# Patient Record
Sex: MALE | Race: WHITE | NOT HISPANIC OR LATINO | Employment: PART TIME | URBAN - METROPOLITAN AREA
[De-identification: names, ages, dates, MRNs, and addresses within clinical notes are randomized per-mention and may not be internally consistent; named-entity substitution may affect disease eponyms.]

---
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
## 2024-01-11 ENCOUNTER — HOSPITAL ENCOUNTER (EMERGENCY)
Facility: CLINIC | Age: 19
Discharge: HOME | End: 2024-01-12
Attending: PHYSICIAN ASSISTANT | Admitting: PHYSICIAN ASSISTANT
Payer: COMMERCIAL

## 2024-01-11 ENCOUNTER — APPOINTMENT (OUTPATIENT)
Dept: GENERAL RADIOLOGY | Facility: CLINIC | Age: 19
End: 2024-01-11
Attending: PHYSICIAN ASSISTANT
Payer: COMMERCIAL

## 2024-01-11 VITALS
HEART RATE: 83 BPM | RESPIRATION RATE: 18 BRPM | OXYGEN SATURATION: 100 % | SYSTOLIC BLOOD PRESSURE: 152 MMHG | DIASTOLIC BLOOD PRESSURE: 92 MMHG | TEMPERATURE: 98 F

## 2024-01-11 DIAGNOSIS — S61.412A LACERATION OF LEFT HAND WITHOUT FOREIGN BODY: ICD-10-CM

## 2024-01-11 PROCEDURE — 73130 X-RAY EXAM OF HAND: CPT | Mod: LT

## 2024-01-11 PROCEDURE — 12042 INTMD RPR N-HF/GENIT2.6-7.5: CPT | Performed by: PHYSICIAN ASSISTANT

## 2024-01-11 PROCEDURE — 99283 EMERGENCY DEPT VISIT LOW MDM: CPT | Mod: 25 | Performed by: PHYSICIAN ASSISTANT

## 2024-01-11 RX ORDER — BUPIVACAINE HYDROCHLORIDE 5 MG/ML
10 INJECTION, SOLUTION EPIDURAL; INTRACAUDAL ONCE
Status: COMPLETED | OUTPATIENT
Start: 2024-01-11 | End: 2024-01-12

## 2024-01-11 ASSESSMENT — ACTIVITIES OF DAILY LIVING (ADL): ADLS_ACUITY_SCORE: 33

## 2024-01-12 PROCEDURE — 90715 TDAP VACCINE 7 YRS/> IM: CPT | Performed by: PHYSICIAN ASSISTANT

## 2024-01-12 PROCEDURE — 250N000011 HC RX IP 250 OP 636: Performed by: PHYSICIAN ASSISTANT

## 2024-01-12 PROCEDURE — 90471 IMMUNIZATION ADMIN: CPT | Performed by: PHYSICIAN ASSISTANT

## 2024-01-12 RX ADMIN — CLOSTRIDIUM TETANI TOXOID ANTIGEN (FORMALDEHYDE INACTIVATED), CORYNEBACTERIUM DIPHTHERIAE TOXOID ANTIGEN (FORMALDEHYDE INACTIVATED), BORDETELLA PERTUSSIS TOXOID ANTIGEN (GLUTARALDEHYDE INACTIVATED), BORDETELLA PERTUSSIS FILAMENTOUS HEMAGGLUTININ ANTIGEN (FORMALDEHYDE INACTIVATED), BORDETELLA PERTUSSIS PERTACTIN ANTIGEN, AND BORDETELLA PERTUSSIS FIMBRIAE 2/3 ANTIGEN 0.5 ML: 5; 2; 2.5; 5; 3; 5 INJECTION, SUSPENSION INTRAMUSCULAR at 00:01

## 2024-01-12 RX ADMIN — BUPIVACAINE HYDROCHLORIDE 50 MG: 5 INJECTION, SOLUTION EPIDURAL; INTRACAUDAL at 00:00

## 2024-01-12 NOTE — DISCHARGE INSTRUCTIONS
It was a pleasure working with you today!  I hope your condition improves rapidly!     Change your dressing once daily.  Use bacitracin ointment underneath the dressing for the first 4 days, and then just use a regular bandage.  Keep your wound dry for the next 1 week if at all possible.  Sutures can be considered for removal in 12 days.

## 2024-01-12 NOTE — ED PROVIDER NOTES
History     Chief Complaint   Patient presents with    Laceration     HPI  Reagan Smalls is a 18 year old male who presents with his mother for evaluation of a laceration to the left palm that occurred roughly 2.5 hours prior to this evaluation.  States that he was using an electric grinding wheel.  The wheel broke apart and struck his palm.  He immediately had bleeding, which she controlled with pressure.  He does not think there is any foreign body in the hand.  Denies any alteration to his range of motion or strength.  Sensation normal per his report.  Unsure when his last tetanus was.  Based on chart review, last tetanus was early 2016.    Allergies:  Allergies   Allergen Reactions    Amoxicillin Rash    Pcn [Penicillins] Rash       Problem List:    Patient Active Problem List    Diagnosis Date Noted    Osteochondral defect of ankle 05/29/2019     Priority: Medium    Intermittent asthma 01/20/2012     Priority: Medium     Triggers: exercise, cats, spring and fall allergies            Severe obesity (H) 02/04/2010     Priority: Medium    Nevus 02/04/2010     Priority: Medium     6 x 3 mm, left hip             Past Medical History:    Past Medical History:   Diagnosis Date    Wheezing        Past Surgical History:    Past Surgical History:   Procedure Laterality Date    CIRCUMCISION,OTHER,<28 D/O      PE TUBES  2006    Persistent OME    TONSILLECTOMY, ADENOIDECTOMY, COMBINED  7/26/2011    Procedure:COMBINED TONSILLECTOMY, ADENOIDECTOMY; Surgeon:VAHID ALMENDAREZ; Location:[facility] OR       Family History:    Family History   Problem Relation Age of Onset    Asthma No family hx of     Coronary Artery Disease No family hx of     Colon Cancer No family hx of     Depression No family hx of     Mental Illness No family hx of     Osteoporosis No family hx of     Hypertension No family hx of        Social History:  Marital Status:  Single [1]  Social History     Tobacco Use    Smoking status: Never    Smokeless tobacco:  Never    Tobacco comments:     no exposure   Substance Use Topics    Alcohol use: No    Drug use: No        Medications:    acetaminophen (TYLENOL) 500 MG tablet  albuterol (PROAIR HFA/PROVENTIL HFA/VENTOLIN HFA) 108 (90 Base) MCG/ACT inhaler  fluticasone (FLONASE) 50 MCG/ACT nasal spray  ibuprofen (ADVIL/MOTRIN) 400 MG tablet          Review of Systems   All other systems reviewed and are negative.      Physical Exam   BP: (!) 152/92  Pulse: 83  Temp: 98  F (36.7  C)  Resp: 18  SpO2: 100 %      Physical Exam  Vitals and nursing note reviewed.   Constitutional:       General: He is not in acute distress.     Appearance: He is not diaphoretic.   HENT:      Head: Normocephalic and atraumatic.      Right Ear: External ear normal.      Left Ear: External ear normal.      Nose: Nose normal.      Mouth/Throat:      Pharynx: No oropharyngeal exudate.   Eyes:      General: No scleral icterus.        Right eye: No discharge.         Left eye: No discharge.      Conjunctiva/sclera: Conjunctivae normal.      Pupils: Pupils are equal, round, and reactive to light.   Neck:      Thyroid: No thyromegaly.   Cardiovascular:      Rate and Rhythm: Normal rate and regular rhythm.      Heart sounds: Normal heart sounds. No murmur heard.  Pulmonary:      Effort: Pulmonary effort is normal. No respiratory distress.      Breath sounds: Normal breath sounds. No wheezing or rales.   Chest:      Chest wall: No tenderness.   Abdominal:      General: Bowel sounds are normal. There is no distension.      Palpations: Abdomen is soft. There is no mass.      Tenderness: There is no abdominal tenderness. There is no guarding or rebound.   Musculoskeletal:         General: No tenderness or deformity. Normal range of motion.      Cervical back: Normal range of motion and neck supple.   Lymphadenopathy:      Cervical: No cervical adenopathy.   Skin:     General: Skin is warm and dry.      Capillary Refill: Capillary refill takes less than 2 seconds.       Findings: No erythema or rash.      Comments: 3.3 cm burst type laceration to the left thenar eminence without tendon or nerve involvement.  There is some burnt tissue and also loss of tissue due to the spinning fly wheel type laceration.  This was not cut by a sharp knife.  No alteration to his thumb range of motion, sensation, or cap refill.  Normal range of motion at the MCP and IP joint with intact strength with flexion and extension testing.  I do not see any evidence for foreign body.   Neurological:      Mental Status: He is alert and oriented to person, place, and time.      Cranial Nerves: No cranial nerve deficit.   Psychiatric:         Behavior: Behavior normal.         Thought Content: Thought content normal.         ED [facility]    -Laceration Repair    Date/Time: 1/12/2024 12:29 AM    Performed by: Lincoln Stewart PA-C  Authorized by: Lincoln Stewart PA-C    Risks, benefits and alternatives discussed.      ANESTHESIA (see MAR for exact dosages):     Anesthesia method:  Local infiltration    Local anesthetic:  Bupivacaine 0.5% w/o epi  LACERATION DETAILS     Location:  Hand    Hand location:  L palm    Length (cm):  3.3    REPAIR TYPE:     Repair type:  Simple    EXPLORATION:     Wound exploration: wound explored through full range of motion and entire depth of wound probed and visualized      Wound extent: no foreign body, no signs of injury, no nerve damage and no tendon damage      Contaminated: no      TREATMENT:     Area cleansed with:  Saline    Amount of cleaning:  Extensive    Irrigation solution:  Sterile saline    Irrigation volume:  Per ED Tech    Visualized foreign bodies/material removed: no      SKIN REPAIR     Repair method:  Sutures    Suture size:  4-0    Suture material:  Nylon    Suture technique:  Simple interrupted    Number of sutures:  6    APPROXIMATION     Approximation:  Close    POST-PROCEDURE DETAILS      Dressing:  Antibiotic ointment, sterile dressing and bulky dressing      PROCEDURE    Patient Tolerance:  Patient tolerated the procedure well with no immediate complications                Critical Care time:  none               Results for orders placed or performed during the hospital encounter of 01/11/24 (from the past 24 hour(s))   XR Hand Left 3 Views    Narrative    EXAM: XR HAND LEFT G/E 3 VIEWS  LOCATION: [facility]  DATE: 1/12/2024    INDICATION: thenar eminence laceration, concern for FB  COMPARISON: None.      Impression    IMPRESSION: Normal joint spaces and alignment. No fracture. No opaque foreign body.       Medications   Tdap (tetanus-diphtheria-acell pertussis) (ADACEL) injection 0.5 mL (0.5 mLs Intramuscular $Given 1/12/24 0001)   bupivacaine (MARCAINE) 0.5% preservative free injection (50 mg Intradermal $Given 1/12/24 0000)       Assessments & Plan (with Medical Decision Making)  Laceration of left hand without foreign body     18 year old male presents for evaluation of a laceration to his left palm.  Over the thenar eminence.  Hit with a grinding spinning wheel.  No alteration to tendon or nerve function.  On exam strength is intact.  Sensation intact.  3.3 cm burst type laceration noted.  There is some minor burn tissue present and some loss of tissue as well given the type of laceration.  Down to the subcutaneous fat, but no muscle belly involvement.  Local anesthesia with 0.5% bupivacaine without epinephrine.  Aggressively irrigated by nursing.  X-ray confirmed no sign of foreign body.  #Six 4-0 Ethilon interrupted sutures utilized for approximation of the wound edges.  Bacitracin ointment and a large dressing applied.  Tetanus updated.  Wound care measures reviewed.  Indications for return discussed.  Sutures can be considered for removal in 12 days.  Patient was in agreement and he was suitable for discharge.     I have reviewed the nursing notes.    I have  reviewed the findings, diagnosis, plan and need for follow up with the patient.           Medical Decision Making  The patient's presentation was of moderate complexity (an acute complicated injury).    The patient's evaluation involved:  ordering and/or review of 1 test(s) in this encounter (see separate area of note for details)    The patient's management necessitated moderate risk (a decision regarding minor procedure (laceration repair) with risk factors of none).        New Prescriptions    No medications on file       Final diagnoses:   Laceration of left hand without foreign body     Disclaimer: This note consists of symbols derived from keyboarding, dictation and/or voice recognition software. As a result, there may be errors in the script that have gone undetected. Please consider this when interpreting information found in this chart.      1/11/2024   [facility] EMERGENCY DEPT       Lincoln Stewart PA-C  01/12/24 0032

## 2024-01-12 NOTE — ED TRIAGE NOTES
C/o lac from a grinding disc to L hand base of thumb.      Triage Assessment (Adult)       Row Name 01/11/24 2274          Triage Assessment    Airway WDL WDL        Respiratory WDL    Respiratory WDL WDL        Cardiac WDL    Cardiac WDL WDL        Peripheral/Neurovascular WDL    Peripheral Neurovascular WDL WDL

## 2025-01-30 NOTE — PATIENT INSTRUCTIONS
Order to begin physical therapy.  Soap and water once daily on the shin.  Sterile dressing.  Stay in the fracture boot.  May begin weightbearing in the fracture boot but stay in the fracture boot for all activities until 6-8 weeks and starting physical therapy.   No lymphadedenopathy